# Patient Record
Sex: FEMALE | Race: WHITE | NOT HISPANIC OR LATINO | Employment: UNEMPLOYED | URBAN - METROPOLITAN AREA
[De-identification: names, ages, dates, MRNs, and addresses within clinical notes are randomized per-mention and may not be internally consistent; named-entity substitution may affect disease eponyms.]

---

## 2019-08-26 ENCOUNTER — HOSPITAL ENCOUNTER (EMERGENCY)
Facility: MEDICAL CENTER | Age: 24
End: 2019-08-26
Attending: EMERGENCY MEDICINE
Payer: MEDICAID

## 2019-08-26 ENCOUNTER — APPOINTMENT (OUTPATIENT)
Dept: RADIOLOGY | Facility: MEDICAL CENTER | Age: 24
End: 2019-08-26
Attending: EMERGENCY MEDICINE
Payer: MEDICAID

## 2019-08-26 VITALS
RESPIRATION RATE: 18 BRPM | DIASTOLIC BLOOD PRESSURE: 97 MMHG | BODY MASS INDEX: 24.65 KG/M2 | OXYGEN SATURATION: 97 % | HEART RATE: 89 BPM | TEMPERATURE: 97.4 F | HEIGHT: 63 IN | WEIGHT: 139.11 LBS | SYSTOLIC BLOOD PRESSURE: 130 MMHG

## 2019-08-26 DIAGNOSIS — O20.0 THREATENED MISCARRIAGE IN EARLY PREGNANCY: ICD-10-CM

## 2019-08-26 LAB
ALBUMIN SERPL BCP-MCNC: 4.6 G/DL (ref 3.2–4.9)
ALBUMIN/GLOB SERPL: 1.3 G/DL
ALP SERPL-CCNC: 70 U/L (ref 30–99)
ALT SERPL-CCNC: 17 U/L (ref 2–50)
ANION GAP SERPL CALC-SCNC: 10 MMOL/L (ref 0–11.9)
APPEARANCE UR: CLEAR
AST SERPL-CCNC: 16 U/L (ref 12–45)
B-HCG SERPL-ACNC: 294.9 MIU/ML (ref 0–5)
BACTERIA #/AREA URNS HPF: ABNORMAL /HPF
BASOPHILS # BLD AUTO: 0.4 % (ref 0–1.8)
BASOPHILS # BLD: 0.06 K/UL (ref 0–0.12)
BILIRUB SERPL-MCNC: 0.5 MG/DL (ref 0.1–1.5)
BILIRUB UR QL STRIP.AUTO: NEGATIVE
BUN SERPL-MCNC: 7 MG/DL (ref 8–22)
CALCIUM SERPL-MCNC: 9.9 MG/DL (ref 8.5–10.5)
CHLORIDE SERPL-SCNC: 106 MMOL/L (ref 96–112)
CO2 SERPL-SCNC: 21 MMOL/L (ref 20–33)
COLOR UR: YELLOW
CREAT SERPL-MCNC: 0.79 MG/DL (ref 0.5–1.4)
EOSINOPHIL # BLD AUTO: 0.05 K/UL (ref 0–0.51)
EOSINOPHIL NFR BLD: 0.3 % (ref 0–6.9)
EPI CELLS #/AREA URNS HPF: ABNORMAL /HPF
ERYTHROCYTE [DISTWIDTH] IN BLOOD BY AUTOMATED COUNT: 45.6 FL (ref 35.9–50)
GLOBULIN SER CALC-MCNC: 3.6 G/DL (ref 1.9–3.5)
GLUCOSE SERPL-MCNC: 93 MG/DL (ref 65–99)
GLUCOSE UR STRIP.AUTO-MCNC: NEGATIVE MG/DL
HCG SERPL QL: POSITIVE
HCT VFR BLD AUTO: 50.1 % (ref 37–47)
HGB BLD-MCNC: 16 G/DL (ref 12–16)
HYALINE CASTS #/AREA URNS LPF: ABNORMAL /LPF
IMM GRANULOCYTES # BLD AUTO: 0.09 K/UL (ref 0–0.11)
IMM GRANULOCYTES NFR BLD AUTO: 0.6 % (ref 0–0.9)
KETONES UR STRIP.AUTO-MCNC: NEGATIVE MG/DL
LEUKOCYTE ESTERASE UR QL STRIP.AUTO: NEGATIVE
LYMPHOCYTES # BLD AUTO: 2.84 K/UL (ref 1–4.8)
LYMPHOCYTES NFR BLD: 18.1 % (ref 22–41)
MCH RBC QN AUTO: 30.6 PG (ref 27–33)
MCHC RBC AUTO-ENTMCNC: 31.9 G/DL (ref 33.6–35)
MCV RBC AUTO: 95.8 FL (ref 81.4–97.8)
MICRO URNS: ABNORMAL
MONOCYTES # BLD AUTO: 0.91 K/UL (ref 0–0.85)
MONOCYTES NFR BLD AUTO: 5.8 % (ref 0–13.4)
NEUTROPHILS # BLD AUTO: 11.75 K/UL (ref 2–7.15)
NEUTROPHILS NFR BLD: 74.8 % (ref 44–72)
NITRITE UR QL STRIP.AUTO: NEGATIVE
NRBC # BLD AUTO: 0 K/UL
NRBC BLD-RTO: 0 /100 WBC
NUMBER OF RH DOSES IND 8505RD: NORMAL
PH UR STRIP.AUTO: 5.5 [PH] (ref 5–8)
PLATELET # BLD AUTO: 344 K/UL (ref 164–446)
PMV BLD AUTO: 9.1 FL (ref 9–12.9)
POTASSIUM SERPL-SCNC: 3.8 MMOL/L (ref 3.6–5.5)
PROT SERPL-MCNC: 8.2 G/DL (ref 6–8.2)
PROT UR QL STRIP: NEGATIVE MG/DL
RBC # BLD AUTO: 5.23 M/UL (ref 4.2–5.4)
RBC # URNS HPF: ABNORMAL /HPF
RBC UR QL AUTO: ABNORMAL
RH BLD: NORMAL
SODIUM SERPL-SCNC: 137 MMOL/L (ref 135–145)
SP GR UR STRIP.AUTO: 1.02
UROBILINOGEN UR STRIP.AUTO-MCNC: 0.2 MG/DL
WBC # BLD AUTO: 15.7 K/UL (ref 4.8–10.8)
WBC #/AREA URNS HPF: ABNORMAL /HPF

## 2019-08-26 PROCEDURE — 99284 EMERGENCY DEPT VISIT MOD MDM: CPT

## 2019-08-26 PROCEDURE — 86901 BLOOD TYPING SEROLOGIC RH(D): CPT

## 2019-08-26 PROCEDURE — 84702 CHORIONIC GONADOTROPIN TEST: CPT

## 2019-08-26 PROCEDURE — 84703 CHORIONIC GONADOTROPIN ASSAY: CPT

## 2019-08-26 PROCEDURE — 36415 COLL VENOUS BLD VENIPUNCTURE: CPT

## 2019-08-26 PROCEDURE — 85025 COMPLETE CBC W/AUTO DIFF WBC: CPT

## 2019-08-26 PROCEDURE — 80053 COMPREHEN METABOLIC PANEL: CPT

## 2019-08-26 PROCEDURE — 81001 URINALYSIS AUTO W/SCOPE: CPT

## 2019-08-26 PROCEDURE — 76801 OB US < 14 WKS SINGLE FETUS: CPT

## 2019-08-26 NOTE — ED PROVIDER NOTES
"ED Provider Note    CHIEF COMPLAINT  Chief Complaint   Patient presents with   • Vaginal Bleeding   • Pregnancy       HPI  Spencer Cassidy is a 24 y.o. female who presents for evaluation of pregnancy and vaginal bleeding.  Patient is G1, P0 Ab0 whose LMP was 7/21/2019.  Patient has taken 3 home pregnancy tests which were positive.  Patient states that 1 hour prior to arrival she was just sitting with a friend when she began having some vaginal bleeding.  The patient cannot quantify how much bleeding she is had at this time.  She has some mild cramping.  She denies recent: Fever, chills, URI symptoms, cardiorespiratory symptoms, nausea, vomiting, hematemesis, melena hematochezia, hematuria, dysuria.  No other acute symptomatology or complaints.    REVIEW OF SYSTEMS  See HPI for further details.  She denies major health problems such as hypertension, diabetes, seizures, cardia pulmonary disorders, gastrointestinal disorders, genitourinary disorders.  All other systems negative.    PAST MEDICAL HISTORY  History reviewed. No pertinent past medical history.    FAMILY HISTORY  History reviewed. No pertinent family history.    SOCIAL HISTORY  Positive tobacco use; denies alcohol use; positive marijuana use;    SURGICAL HISTORY  History reviewed. No pertinent surgical history.    CURRENT MEDICATIONS  Home Medications     Reviewed by Solo Moses R.N. (Registered Nurse) on 08/26/19 at 1238  Med List Status: Complete   Medication Last Dose Status        Patient Paul Taking any Medications                       ALLERGIES  No Known Allergies    PHYSICAL EXAM  VITAL SIGNS: BP (!) 177/114   Pulse 99   Temp 35.9 °C (96.6 °F) (Temporal)   Resp 20   Ht 1.6 m (5' 3\")   Wt 63.1 kg (139 lb 1.8 oz)   LMP 07/21/2019 (Exact Date)   SpO2 98%   BMI 24.64 kg/m²    Constitutional: 24-year-old female, tearful and anxious, well developed, Well nourished,   HENT: ,Atraumatic, Bilateral external ears normal, tympanic membranes clear, " Oropharynx moist, No oral exudates, Nose normal.   Eyes: PERRL, EOMI, Conjunctiva normal, No discharge.   Neck: Normal range of motion, No tenderness, Supple, No stridor.   Lymphatic: No lymphadenopathy noted.   Cardiovascular: Normal heart rate, Normal rhythm, No murmurs, No rubs, No gallops.   Thorax & Lungs: Normal Equal breath sounds, No respiratory distress, No wheezing, no stridor, no rales. No chest tenderness.   Abdomen: Soft, nontender, nondistended, no organomegaly, positive bowel sounds normal in quality. No guarding or rebound; no uterine enlargement; no adnexal tenderness masses or fullness; no localized pain McBurney's point; negative Rovsing sign;  Skin: Good skin turgor, pink, warm, dry. No rashes, petechiae, purpura. Normal capillary refill.   Back: No tenderness, No CVA tenderness.   Extremities: Intact distal pulses, No edema, No tenderness, No cyanosis, No clubbing. Vascular: Pulses are 2+, symmetric in the upper and lower extremities.  Musculoskeletal: Good range of motion in all major joints. No tenderness to palpation or major deformities noted.   Neurologic: Alert & oriented x 3, Normal motor function, Normal sensory function, No gross focal deficits noted.   Psychiatric: Affect normal, Judgment normal, Mood normal.     RADIOLOGY/PROCEDURES  US-OB 1ST TRIMESTER WITH TRANSVAGINAL (COMBO)   Final Result      1.  Normal appearance of the uterus and ovaries      2.  No intrauterine pregnancy identified and there is no evidence for ruptured ectopic pregnancy            COURSE & MEDICAL DECISION MAKING  Pertinent Labs & Imaging studies reviewed. (See chart for details)  1.  Saline lock    Laboratory studies: CBC shows white count 15.7, 74% neutrophils, 18% lymphocytes, 5% monocytes, hemoglobin 16.0 hematocrit 50.1; CMP shows a globulin 3.6 otherwise within normal limits; qualitative hCG is positive; Rh is positive;    Discussion: At this time, the patient presents for evaluation of pregnancy and  bleeding.  The patient is very early on her pregnancy by dates and as well as her quantitative beta-hCG.  Ultrasonography does not show an IUP but that is not unexpected due to her low quantitative hCG level.  At this time, we will assume the patient still has potential viable pregnancy.  She will place at pelvic rest.  She was instructed to follow-up with a repeat quantitative hCG in 48 hours and then follow-up with the pregnancy center for the results.  Precautions have been given.  I discussed the findings treatment plan with patient and her fiancé who is at the bedside.  They indicate that they are comfortable with this explanation disposition.    FINAL IMPRESSION  1. Threatened miscarriage in early pregnancy        PLAN  1.  Appropriate discharge instructions given  2.  Follow-up for repeat quantitative hCG in 48 hours  3.  Follow-up with the pregnancy center to review the results  4.  She was instructed recheck of change or worsening symptoms, as discussed    Electronically signed by: Guy G Gansert, 8/26/2019 1:47 PM

## 2019-08-27 NOTE — ED NOTES
Pt given d/c instructions and f/u info with verbal understanding by assist JOI Murray.  VSS at discharge.  PIV d/c'd with tip intact.  Pt dressed independently.  Pt ambulatory from the ED w/ steady gait accompanied by multiple friends.  All belongings in possession on discharge.  Pt escorted to the lobby by RN.

## 2019-09-03 ENCOUNTER — GYNECOLOGY VISIT (OUTPATIENT)
Dept: OBGYN | Facility: CLINIC | Age: 24
End: 2019-09-03
Payer: MEDICAID

## 2019-09-03 VITALS — SYSTOLIC BLOOD PRESSURE: 120 MMHG | BODY MASS INDEX: 24.09 KG/M2 | DIASTOLIC BLOOD PRESSURE: 86 MMHG | WEIGHT: 136 LBS

## 2019-09-03 DIAGNOSIS — O36.80X0 PREGNANCY, LOCATION UNKNOWN: ICD-10-CM

## 2019-09-03 PROCEDURE — 76830 TRANSVAGINAL US NON-OB: CPT | Performed by: OBSTETRICS & GYNECOLOGY

## 2019-09-03 PROCEDURE — 99204 OFFICE O/P NEW MOD 45 MIN: CPT | Mod: 25 | Performed by: OBSTETRICS & GYNECOLOGY

## 2019-09-03 NOTE — PROGRESS NOTES
Chief Complaint   Patient presents with   • Gynecologic Exam     ED follow up    chief complaint: bleeding early pregnancy    History of present illness:   24 y.o.  with last missed her period  presents with above chief complaint.  Patient was seen in the emergency room on 2019 secondary to vaginal bleeding.  Is associated with some cramping.  Denies any recent fevers, chills, pain with urination, chest pain, shortness of breath, nausea, vomiting, blood in urine or stool.    Patient has had some spotting since then.  No trini vaginal bleeding noted.  hCG at time of presentation to emergency room was 294.9.  Rh+    ROS: Pertinent positives documented in HPI and all other systems reviewed & are negative    POBHx:  1 para 0.  Plan pregnancy    PGYNHx: None, last pap unsure    All PMH, PSH, meds, allergies, social history and FH reviewed and updated today:  History reviewed. No pertinent past medical history.    History reviewed. No pertinent surgical history.    Allergies: No Known Allergies    Social History     Socioeconomic History   • Marital status: Single     Spouse name: Not on file   • Number of children: Not on file   • Years of education: Not on file   • Highest education level: Not on file   Occupational History   • Not on file   Social Needs   • Financial resource strain: Not on file   • Food insecurity:     Worry: Not on file     Inability: Not on file   • Transportation needs:     Medical: Not on file     Non-medical: Not on file   Tobacco Use   • Smoking status: Current Every Day Smoker     Packs/day: 0.25     Types: Cigarettes   • Smokeless tobacco: Never Used   Substance and Sexual Activity   • Alcohol use: Not Currently   • Drug use: Yes     Types: Inhaled, Marijuana     Comment: cannabis   • Sexual activity: Not Currently     Partners: Male   Lifestyle   • Physical activity:     Days per week: Not on file     Minutes per session: Not on file   • Stress: Not  on file   Relationships   • Social connections:     Talks on phone: Not on file     Gets together: Not on file     Attends Restorationism service: Not on file     Active member of club or organization: Not on file     Attends meetings of clubs or organizations: Not on file     Relationship status: Not on file   • Intimate partner violence:     Fear of current or ex partner: Not on file     Emotionally abused: Not on file     Physically abused: Not on file     Forced sexual activity: Not on file   Other Topics Concern   • Not on file   Social History Narrative   • Not on file       History reviewed. No pertinent family history.    Physical exam:  /86 (BP Location: Right arm, Patient Position: Sitting)   Wt 61.7 kg (136 lb)     GENERAL APPEARANCE: healthy, alert, no distress  NECK nontender, no masses, thyromegaly or nodules  HEART RRR with normal S1 and S2 ,no murmurs, no gallops, no peripheral edema  LUNG clear to auscultation, normal respiratory effort  ABDOMEN Abdomen soft, non-tender. BS normal. No masses,  No organomegaly  FEMALE GYN: normal female external genitalia without lesions, BUS normal without lesions, vaginal mucosa pink and moist, old bloody vaginal discharge noted, cervix normal in appearance without lesions, no CMT, urethra is normal without discharge or scarring, no bladder fullness or masses, normal anus and perineum. Uterus mobile, normal size, and nontender. Ovaries normal size and nontender bilaterally. No palpable masses.  No inguinal hernia present .  EXTREMITIES:negative clubbing, cyanosis, edema, No deformities, No skin discoloration    NEURO Awake, alert and oriented x 3, Normal gait, no sensory deficits  SKIN No rashes, or ulcers or lesions seen  PSYCHIATRIC: Patient shows appropriate affect, is alert and oriented x3, intact judgment and insight.      Transvaginal US performed and per my read:    Indication: Pregnancy unknown location and early pregnancy vaginal bleeding    Findings: No  clear intrauterine pregnancy found.  Possible small gestational sac noted in the uterine fundus.  No yolk sac noted.   No fetal cardiac activity noted  Right ovary within normal limits. Left Ovary within normal limits. Cervical length 3.5 cm.   No free fluid in the cul-de-sac.    Impression: Pregnancy of unknown location      Assessment:  1. Pregnancy, location unknown  HCG QUANTITATIVE       Plan:    Discussed with patient findings on ultrasound examination.  No evidence of acute abdomen at this time.    Plan will be for serial hCG levels until above the discriminatory zone.    Ectopic precautions discussed with patient.    Follow-up will depend on hCG level and rate of change of this level.    Will follow closely    All questions answered

## 2019-09-03 NOTE — NON-PROVIDER
Pt here today for GYN ED follow up   Was seen on 08/26/19  PT states she is having some mild bleeding   Denies cramping  Phone    Pharmacy verified

## 2019-09-06 ENCOUNTER — TELEPHONE (OUTPATIENT)
Dept: OBGYN | Facility: CLINIC | Age: 24
End: 2019-09-06

## 2019-09-12 ENCOUNTER — GYNECOLOGY VISIT (OUTPATIENT)
Dept: OBGYN | Facility: CLINIC | Age: 24
End: 2019-09-12
Payer: MEDICAID

## 2019-09-12 VITALS — BODY MASS INDEX: 24.98 KG/M2 | SYSTOLIC BLOOD PRESSURE: 112 MMHG | WEIGHT: 141 LBS | DIASTOLIC BLOOD PRESSURE: 58 MMHG

## 2019-09-12 DIAGNOSIS — O36.80X0 PREGNANCY OF UNKNOWN ANATOMIC LOCATION: ICD-10-CM

## 2019-09-12 PROCEDURE — 99213 OFFICE O/P EST LOW 20 MIN: CPT | Performed by: OBSTETRICS & GYNECOLOGY

## 2019-09-12 NOTE — PROGRESS NOTES
Chief Complaint   Patient presents with   • Gynecologic Exam     follow up    Chief complaint: Follow-up pregnancy unknown location      History of present illness: 24 y.o. presents to office for follow-up early pregnancy bleeding.    Patient has continued to have some slight spotting since her last visit.  Mostly when she wipes after going to the bathroom.    hCG increased to approximately 2200 on September 5.    Rh+    Denies any severe abdominal pain    Review of systems:  Pertinent positives documented in HPI and a comprehensive review of system is negative    All PMH, PSH, allergies, social history and FH reviewed and updated today:  No past medical history on file.    No past surgical history on file.    Allergies: No Known Allergies    Social History     Socioeconomic History   • Marital status: Single     Spouse name: Not on file   • Number of children: Not on file   • Years of education: Not on file   • Highest education level: Not on file   Occupational History   • Not on file   Social Needs   • Financial resource strain: Not on file   • Food insecurity:     Worry: Not on file     Inability: Not on file   • Transportation needs:     Medical: Not on file     Non-medical: Not on file   Tobacco Use   • Smoking status: Current Every Day Smoker     Packs/day: 0.25     Types: Cigarettes   • Smokeless tobacco: Never Used   Substance and Sexual Activity   • Alcohol use: Not Currently   • Drug use: Yes     Types: Inhaled, Marijuana     Comment: cannabis   • Sexual activity: Not Currently     Partners: Male   Lifestyle   • Physical activity:     Days per week: Not on file     Minutes per session: Not on file   • Stress: Not on file   Relationships   • Social connections:     Talks on phone: Not on file     Gets together: Not on file     Attends Faith service: Not on file     Active member of club or organization: Not on file     Attends meetings of clubs or organizations: Not on file     Relationship status: Not  on file   • Intimate partner violence:     Fear of current or ex partner: Not on file     Emotionally abused: Not on file     Physically abused: Not on file     Forced sexual activity: Not on file   Other Topics Concern   • Not on file   Social History Narrative   • Not on file       No family history on file.    Physical exam:  /58 (BP Location: Left arm, Patient Position: Sitting)   Wt 64 kg (141 lb)     GENERAL APPEARANCE: healthy, alert, no distress  NECK nontender, no masses, thyromegaly or nodules  ABDOMEN Abdomen soft, non-tender. BS normal. No masses,  No organomegaly  FEMALE GYN: normal female external genitalia without lesions, BUS normal, no vaginal discharge noted, vulva pink without erythema or friability, urethra is normal without discharge or scarring, no bladder fullness or masses, normal external genitalia, no erythema, no discharge, normal anus and perineum.  NEURO Awake, alert and oriented x 3, Normal gait, no sensory deficits  SKIN No rashes, or ulcers or lesions seen  PSYCHIATRIC: Patient shows appropriate affect, is alert and oriented x3, intact judgment and insight.      Vaginal US performed and per my read:    Indication: Pregnancy unknown location    Findings: Possible very early gestational sac noted versus pseudo-sac.  No clear fetal pole or fetal heart activity noted   Right ovary no masses. Left Ovary no masses. Cervical length 3.5 cm.   No free fluid in the cul-de-sac.    Impression: Possible intrauterine pregnancy versus pseudo-sac        Assessment:  1. Pregnancy of unknown anatomic location  HCG QUANTITATIVE    US-PELVIC TRANSVAGINAL ONLY       Plan:    Given the fact that no clear intrauterine pregnancy was seen today, will repeat hCG as well as order ultrasound with radiology for further assessment.    Precautions were discussed with the patient.  Still discussed with the patient that this may be an ectopic pregnancy.  She is to report to the emergency room in case of heavy  vaginal bleeding or worsening abdominal pain.    Rh+    Questions answered    Spent  15 minutes in face-to-face patient contact in which greater than 50% of that visit was spent in counseling/coordination of care including medical and surgical options of care.

## 2019-09-12 NOTE — NON-PROVIDER
Pt here today for follow up appointment to review HCG levels  Phone    Pharmacy verified   Pt states she seen a blood clot and is still lightly bleeding

## 2019-09-24 ENCOUNTER — GYNECOLOGY VISIT (OUTPATIENT)
Dept: OBGYN | Facility: CLINIC | Age: 24
End: 2019-09-24
Payer: MEDICAID

## 2019-09-24 VITALS — SYSTOLIC BLOOD PRESSURE: 110 MMHG | DIASTOLIC BLOOD PRESSURE: 60 MMHG

## 2019-09-24 DIAGNOSIS — O36.80X0 PREGNANCY, LOCATION UNKNOWN: ICD-10-CM

## 2019-09-24 PROCEDURE — 99213 OFFICE O/P EST LOW 20 MIN: CPT | Performed by: OBSTETRICS & GYNECOLOGY

## 2019-09-24 NOTE — NON-PROVIDER
Pt here today for follow up appointment   Pt states that she was not able to schedule appointment for imaging.   Pt states that she is still having vaginal bleeding.

## 2019-09-24 NOTE — PROGRESS NOTES
Chief Complaint   Patient presents with   • Gynecologic Exam     follow up    Chief complaint: Follow-up pregnancy of unknown location      History of present illness: 24 y.o. presents to office for follow-up visit due to pregnancy unknown location.  hCG increased from 300 to approximately 2200 but only increased to approximately 4009 days later.  Patient continues to have bleeding she reports it waxes and wanes between light to heavy bleeding.  No pain.  Frustrated with the bleeding as well.  Would like to have this taken care of as soon as possible.  No fevers, chills, nausea, vomiting,, chest pain, shortness of breath, abdominal pain.      Review of systems:  Pertinent positives documented in HPI and a comprehensive review of system is negative    All PMH, PSH, allergies, social history and FH reviewed and updated today:  No past medical history on file.    No past surgical history on file.    Allergies: No Known Allergies    Social History     Socioeconomic History   • Marital status: Single     Spouse name: Not on file   • Number of children: Not on file   • Years of education: Not on file   • Highest education level: Not on file   Occupational History   • Not on file   Social Needs   • Financial resource strain: Not on file   • Food insecurity:     Worry: Not on file     Inability: Not on file   • Transportation needs:     Medical: Not on file     Non-medical: Not on file   Tobacco Use   • Smoking status: Current Every Day Smoker     Packs/day: 0.25     Types: Cigarettes   • Smokeless tobacco: Never Used   Substance and Sexual Activity   • Alcohol use: Not Currently   • Drug use: Yes     Types: Inhaled, Marijuana     Comment: cannabis   • Sexual activity: Not Currently     Partners: Male   Lifestyle   • Physical activity:     Days per week: Not on file     Minutes per session: Not on file   • Stress: Not on file   Relationships   • Social connections:     Talks on phone: Not on file     Gets together: Not on  file     Attends Yarsanism service: Not on file     Active member of club or organization: Not on file     Attends meetings of clubs or organizations: Not on file     Relationship status: Not on file   • Intimate partner violence:     Fear of current or ex partner: Not on file     Emotionally abused: Not on file     Physically abused: Not on file     Forced sexual activity: Not on file   Other Topics Concern   • Not on file   Social History Narrative   • Not on file       No family history on file.    Physical exam:  /60     GENERAL APPEARANCE: healthy, alert, no distress  NECK nontender, no masses, thyromegaly or nodules  ABDOMEN Abdomen soft, non-tender. BS normal. No masses,  No organomegaly  FEMALE GYN: normal female external genitalia without lesions, BUS normal, no vaginal discharge noted, small amount of blood noted in the vault, vulva pink without erythema or friability, urethra is normal without discharge or scarring, no bladder fullness or masses, normal external genitalia, no erythema, no discharge, normal vagina and normal vaginal tone, normal cervix, normal uterus, size and consistency, normal adnexa without tenderness, normal anus and perineum.  NEURO Awake, alert and oriented x 3, Normal gait, no sensory deficits  SKIN No rashes, or ulcers or lesions seen  PSYCHIATRIC: Patient shows appropriate affect, is alert and oriented x3, intact judgment and insight.       Assessment:  1. Pregnancy, location unknown         Plan:    No intrauterine pregnancy noted on evaluation today.  Thin endometrial stripe noted.  No adnexal masses seen.    Discussed findings with patient.  Plan will be to repeat hCG.    If elevated hCG coupled with no intrauterine findings, will treat as ectopic pregnancy.    If hCG is decreasing, will follow and trend until negative    Cautions discussed with patient.  Will call patient with results     Questions answered    Spent  15 minutes in face-to-face patient contact in which  greater than 50% of that visit was spent in counseling/coordination of care including medical and surgical options of care.

## 2021-09-22 ENCOUNTER — GYNECOLOGY VISIT (OUTPATIENT)
Dept: OBGYN | Facility: CLINIC | Age: 26
End: 2021-09-22
Payer: MEDICAID

## 2021-09-22 VITALS
BODY MASS INDEX: 26.22 KG/M2 | DIASTOLIC BLOOD PRESSURE: 78 MMHG | HEIGHT: 63 IN | WEIGHT: 148 LBS | SYSTOLIC BLOOD PRESSURE: 120 MMHG

## 2021-09-22 DIAGNOSIS — N93.8 DUB (DYSFUNCTIONAL UTERINE BLEEDING): ICD-10-CM

## 2021-09-22 LAB
INT CON NEG: NEGATIVE
INT CON POS: POSITIVE
POC URINE PREGNANCY TEST: POSITIVE

## 2021-09-22 PROCEDURE — 99214 OFFICE O/P EST MOD 30 MIN: CPT | Performed by: OBSTETRICS & GYNECOLOGY

## 2021-09-22 PROCEDURE — 81025 URINE PREGNANCY TEST: CPT | Performed by: OBSTETRICS & GYNECOLOGY

## 2021-09-22 NOTE — PROGRESS NOTES
CC: Missed menses    Leann Lorenza Cassidy is a 26 y.o.  who presents presents due to missed menses. Patient's last menstrual period was 2021. She was not using anything for birthcontrol.  This is a planned and desired pregnancy.   Reports she has been feeling fine thus far in pregnancy. She denies nausea/vomiting, denies headache, denies dysuria, denies  vaginal bleeding/spotting, and denies contractions/cramping.    Partner: Too    She states that her previous pregnancy was called an ectopic but states she passed fetal tissue vaginally.     Review of systems:  Pertinent positives documented in HPI and all other systems reviewed & are negative    GYN History:  Patient's last menstrual period was 2021.. Menarche @16.  Menses regular, lasting 5days, not particularly heavy.  Last pap- unknwon,  no h/o abnormal pap.  No history of cone biopsy, LEEP or any other cervical, uterine or gynecologic surgery. Hx of chlamydia in high school.  Currently sexually active with her partner.      OB History:    OB History    Para Term  AB Living   2       1     SAB TAB Ectopic Molar Multiple Live Births       1            # Outcome Date GA Lbr Jimmie/2nd Weight Sex Delivery Anes PTL Lv   2 Current            1 Ectopic 2019 5w0d             Birth Comments: Pt states was told did not need tube removed.        All PMH, PSH, allergies, social history and FH reviewed and updated today:  Past Medical History:  History reviewed. No pertinent past medical history.    Past Surgical History:  History reviewed. No pertinent surgical history.    Medications:   Current Outpatient Medications Ordered in Epic   Medication Sig Dispense Refill   • Prenatal MV-Min-Fe Fum-FA-DHA (PRENATAL 1 PO) Take  by mouth.       No current Epic-ordered facility-administered medications on file.       Allergies: Patient has no known allergies.    Social History:  Social History     Socioeconomic History   • Marital status: Single     " Spouse name: Not on file   • Number of children: Not on file   • Years of education: Not on file   • Highest education level: Not on file   Occupational History   • Not on file   Tobacco Use   • Smoking status: Former Smoker     Packs/day: 0.25     Types: Cigarettes   • Smokeless tobacco: Never Used   Vaping Use   • Vaping Use: Some days   • Substances: Nicotine (3 mg)   Substance and Sexual Activity   • Alcohol use: Not Currently   • Drug use: Yes     Types: Inhaled, Marijuana     Comment: cannabis   • Sexual activity: Yes     Partners: Male     Birth control/protection: None   Other Topics Concern   • Not on file   Social History Narrative   • Not on file     Social Determinants of Health     Financial Resource Strain:    • Difficulty of Paying Living Expenses:    Food Insecurity:    • Worried About Running Out of Food in the Last Year:    • Ran Out of Food in the Last Year:    Transportation Needs:    • Lack of Transportation (Medical):    • Lack of Transportation (Non-Medical):    Physical Activity:    • Days of Exercise per Week:    • Minutes of Exercise per Session:    Stress:    • Feeling of Stress :    Social Connections:    • Frequency of Communication with Friends and Family:    • Frequency of Social Gatherings with Friends and Family:    • Attends Synagogue Services:    • Active Member of Clubs or Organizations:    • Attends Club or Organization Meetings:    • Marital Status:    Intimate Partner Violence:    • Fear of Current or Ex-Partner:    • Emotionally Abused:    • Physically Abused:    • Sexually Abused:        Family History:  Family History   Problem Relation Age of Onset   • No Known Problems Mother    • No Known Problems Father    • No Known Problems Sister            Objective:   Vitals:  /78   Ht 1.6 m (5' 3\")   Wt 67.1 kg (148 lb)   Body mass index is 26.22 kg/m². (Goal BM I>18 <25)  Exam:  General: appears stated age, is in no apparent distress, is well developed and well " nourished  Head: normocephalic, non-tender  Neck: neck is supple  Abdomen: Bowel sounds positive, nondistended, soft, nontender x4, no rebound or guarding. No organomegaly. No masses.   Female GYN: normal female external genitalia without lesions  Skin: No rashes, or ulcers or lesions seen  Psychiatric: appropriate affect, alert and oriented x3, intact judgment and insight.    Procedure:  Transvaginal US performed by me and per my read:    Indication: amenorrhea.     Findings: castillo intrauterine pregnancy @ 11w6d by CRL. Positive yolk sac. Positive fetal cardiac activity @ 160s BPM. Right ovary not seen. Left Ovary not seen. Cervical length 3.4 cm. No free fluid in the cul-de-sac.    Impression: viable IUP @ 11w6d.  CHANELLE by US of 21.    No results found for this or any previous visit (from the past 336 hour(s)).   Pregnancy exam/test positive    A/P:   26 y.o.  here for   1. DUB (dysfunctional uterine bleeding)  POCT Pregnancy       #Missed menses - amenorrhea due to pregnancy.  US today is c/w LMP. CHANELLE of 2022.  Discussed pregnancy with patient who is happy.    --Normal pregnancy s/s discussed  --Advised prenatal vitamins, healthy well rounded diet, adequate hydration, and continued exercise.    #Cervical cancer screening.  Last pap unknown, needs at NOB.    #Will order prenatal labs and any additional imaging/testing needed at new OB visit  #SAB precautions discussed.  #Discussed the size of our practice and that she will see multiple providers during the course of her care. She expresses understanding.   #Follow up in 2-4 weeks for new OB visit.    30 minutes were spent in face-to-face patient contact with patient, greater than 50% of which was was spent in counseling and coordination of care for her newly diagnosed pregnancy including medical and surgical options of care.    DALIA

## 2021-09-22 NOTE — NON-PROVIDER
Patient here for GYN/DUB  LMP: 7/1/2021  CHANELLE: 4/7/2022  GA: 11w6d  Last pap: 2 yrs ago WNL  # [phone]  Pharmacy verified   Pt states no complaints.   Pregnancy test in clinic today positive

## 2021-09-27 ENCOUNTER — APPOINTMENT (OUTPATIENT)
Dept: OBGYN | Facility: CLINIC | Age: 26
End: 2021-09-27
Payer: MEDICAID

## 2021-09-27 ENCOUNTER — INITIAL PRENATAL (OUTPATIENT)
Dept: OBGYN | Facility: CLINIC | Age: 26
End: 2021-09-27
Payer: MEDICAID

## 2021-09-27 VITALS
HEIGHT: 63 IN | DIASTOLIC BLOOD PRESSURE: 62 MMHG | BODY MASS INDEX: 26.4 KG/M2 | WEIGHT: 149 LBS | SYSTOLIC BLOOD PRESSURE: 110 MMHG

## 2021-09-27 DIAGNOSIS — Z34.90 PRENATAL CARE, ANTEPARTUM: ICD-10-CM

## 2021-09-27 DIAGNOSIS — Z34.01 SUPERVISION OF NORMAL FIRST PREGNANCY IN FIRST TRIMESTER: ICD-10-CM

## 2021-09-27 DIAGNOSIS — Z87.828 HISTORY OF TRAUMA: ICD-10-CM

## 2021-09-27 DIAGNOSIS — F12.90 MARIJUANA USE: ICD-10-CM

## 2021-09-27 PROBLEM — O36.80X0 PREGNANCY, LOCATION UNKNOWN: Status: RESOLVED | Noted: 2019-09-03 | Resolved: 2021-09-27

## 2021-09-27 LAB
APPEARANCE UR: NORMAL
BILIRUB UR STRIP-MCNC: NORMAL MG/DL
COLOR UR AUTO: NORMAL
GLUCOSE UR STRIP.AUTO-MCNC: NEGATIVE MG/DL
KETONES UR STRIP.AUTO-MCNC: NEGATIVE MG/DL
LEUKOCYTE ESTERASE UR QL STRIP.AUTO: NEGATIVE
NITRITE UR QL STRIP.AUTO: NEGATIVE
PH UR STRIP.AUTO: 5.5 [PH] (ref 5–8)
PROT UR QL STRIP: NEGATIVE MG/DL
RBC UR QL AUTO: NEGATIVE
SP GR UR STRIP.AUTO: 1.01
UROBILINOGEN UR STRIP-MCNC: NORMAL MG/DL

## 2021-09-27 PROCEDURE — 90686 IIV4 VACC NO PRSV 0.5 ML IM: CPT | Performed by: PHYSICIAN ASSISTANT

## 2021-09-27 PROCEDURE — 81002 URINALYSIS NONAUTO W/O SCOPE: CPT | Performed by: PHYSICIAN ASSISTANT

## 2021-09-27 PROCEDURE — 0500F INITIAL PRENATAL CARE VISIT: CPT | Performed by: PHYSICIAN ASSISTANT

## 2021-09-27 PROCEDURE — 90471 IMMUNIZATION ADMIN: CPT | Performed by: PHYSICIAN ASSISTANT

## 2021-09-27 ASSESSMENT — ENCOUNTER SYMPTOMS
RESPIRATORY NEGATIVE: 1
DEPRESSION: 0
EYES NEGATIVE: 1
GASTROINTESTINAL NEGATIVE: 1
NEUROLOGICAL NEGATIVE: 1
NERVOUS/ANXIOUS: 0
MUSCULOSKELETAL NEGATIVE: 1
PSYCHIATRIC NEGATIVE: 1
CONSTITUTIONAL NEGATIVE: 1
CARDIOVASCULAR NEGATIVE: 1

## 2021-09-27 ASSESSMENT — EDINBURGH POSTNATAL DEPRESSION SCALE (EPDS)
I HAVE BEEN ABLE TO LAUGH AND SEE THE FUNNY SIDE OF THINGS: AS MUCH AS I ALWAYS COULD
THINGS HAVE BEEN GETTING ON TOP OF ME: NO, MOST OF THE TIME I HAVE COPED QUITE WELL
I HAVE FELT SCARED OR PANICKY FOR NO GOOD REASON: NO, NOT AT ALL
I HAVE BEEN SO UNHAPPY THAT I HAVE BEEN CRYING: NO, NEVER
I HAVE BEEN SO UNHAPPY THAT I HAVE HAD DIFFICULTY SLEEPING: NOT AT ALL
I HAVE FELT SAD OR MISERABLE: NO, NOT AT ALL
THE THOUGHT OF HARMING MYSELF HAS OCCURRED TO ME: NEVER
TOTAL SCORE: 3
I HAVE BLAMED MYSELF UNNECESSARILY WHEN THINGS WENT WRONG: NOT VERY OFTEN
I HAVE BEEN ANXIOUS OR WORRIED FOR NO GOOD REASON: NO, NOT AT ALL
I HAVE LOOKED FORWARD WITH ENJOYMENT TO THINGS: RATHER LESS THAN I USED TO
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
## 2021-10-08 DIAGNOSIS — Z34.01 SUPERVISION OF NORMAL FIRST PREGNANCY IN FIRST TRIMESTER: ICD-10-CM

## 2021-10-18 ENCOUNTER — ROUTINE PRENATAL (OUTPATIENT)
Dept: OBGYN | Facility: CLINIC | Age: 26
End: 2021-10-18
Payer: MEDICAID

## 2021-10-18 VITALS — BODY MASS INDEX: 26.82 KG/M2 | WEIGHT: 151.4 LBS | DIASTOLIC BLOOD PRESSURE: 62 MMHG | SYSTOLIC BLOOD PRESSURE: 108 MMHG

## 2021-10-18 DIAGNOSIS — Z34.01 SUPERVISION OF NORMAL FIRST PREGNANCY IN FIRST TRIMESTER: ICD-10-CM

## 2021-10-18 PROCEDURE — 90040 PR PRENATAL FOLLOW UP: CPT | Performed by: PHYSICIAN ASSISTANT

## 2021-10-18 NOTE — PROGRESS NOTES
Pt. Here for OB/FU.   Good # [phone]  Pt states no complaints.   Pharmacy verified.   Chaperone offered and not needed.   Pt states hasn't done blood work because she was unable to get an appt.   Due to her leaving next week.

## 2021-10-18 NOTE — NON-PROVIDER
"Pt is 15w4d and is presenting for an OB follow up. Pt is moving to [place] with mother next week and plans on doing prenatal labs there. Pt is wanting an ultrasound today. Denies nausea, vomiting, bleeding, LOF, constipation, and diarrhea. Pt feeling fetal movement she describes as \"flutters.\" Admits to drinking lots of water. No other questions or concerns.     Pt is alert and oriented and not in any acute distress. Fundal height measured about 15cm and fetal heart tones measured at 150bpm on doppler.     Pt encouraged to continue drinking lots of water. Pt encouraged to get prenatal labs done as soon as she arrives in [place] and to schedule an ultrasound for 20 weeks. Pt to follow up in [place] or [place]N.   "

## 2021-10-18 NOTE — PROGRESS NOTES
"Pt has no complaints with cramping, bleeding or pain. +FM - flutters only. PAP wnl, GC/CT neg - pt notified of results. Pt hasnt been able to do PNL yet, as no appt til end of November. Pt to move to [place] next week, so will plan on all care, labs, US then. Decline AFP today. RTC 4 wk or sooner prn.     Note by ASA Danielson:    Pt is 15w4d and is presenting for an OB follow up. Pt is moving to [place] with mother next week and plans on doing prenatal labs there. Pt is wanting an ultrasound today. Denies nausea, vomiting, bleeding, LOF, constipation, and diarrhea. Pt feeling fetal movement she describes as \"flutters.\" Admits to drinking lots of water. No other questions or concerns.      Pt is alert and oriented and not in any acute distress. Fundal height measured about 15cm and fetal heart tones measured at 150bpm on doppler.      Pt encouraged to continue drinking lots of water. Pt encouraged to get prenatal labs done as soon as she arrives in [place] and to schedule an ultrasound for 20 weeks. Pt to follow up in [place] or [place]N.   "

## 2021-11-08 ENCOUNTER — TELEPHONE (OUTPATIENT)
Dept: OBGYN | Facility: CLINIC | Age: 26
End: 2021-11-08

## 2021-11-08 NOTE — TELEPHONE ENCOUNTER
Patient called stating needs  proof that she received flu vaccine in our office to be able to work.  Patient states is in [place] and lives there now. Informed patient that because she lives in a different state she needs to sign a medical release form.     Patient upset and transferred to medical records for releasing of information.